# Patient Record
Sex: MALE | Race: WHITE | NOT HISPANIC OR LATINO | URBAN - NONMETROPOLITAN AREA
[De-identification: names, ages, dates, MRNs, and addresses within clinical notes are randomized per-mention and may not be internally consistent; named-entity substitution may affect disease eponyms.]

---

## 2016-08-10 PROBLEM — H52.4: Noted: 2019-05-21

## 2016-08-10 PROBLEM — H35.3131: Noted: 2017-02-08

## 2016-08-10 PROBLEM — H40.013: Noted: 2019-05-22

## 2017-03-23 NOTE — PROCEDURE NOTE: CLINICAL
PROCEDURE NOTE: Lucentis 0.5 mg OS. Diagnosis: Neovascular AMD with Active CNV. Anesthesia: Topical/Subconjunctival. Prep: Betadine Flush. Prior to injection, risks/benefits/alternatives discussed including infection, loss of vision, hemorrhage, cataract, glaucoma, retinal tears or detachment and patient wished to proceed. Topical anesthesia was induced with Alcaine. Additional anesthesia was achieved using drop(s) or injection checked above. A drop of Povidone-iodine 5% ophthalmic solution was instilled over the injection site and in the inferior fornix. Betadine prep was performed. A single use vial of intravitreal Lucentis 0.5mg/0.05ml was used and excess discarded. The needle was passed 3.0 mm posterior to the limbus in pseudophakic patients, and 3.5 mm posterior to the limbus in phakic patients. The remainder of the Lucentis 0.5mg in the single-use vial was then discarded in a medical waste disposal container. The eye was irrigated with sterile irrigating solution. Patient tolerated the procedure well. There were no complications. Post procedure instructions given. Injection Time 10:41 AM. CF vision checked. The patient was instructed to return for re-evaluation in approximately 4-12 weeks depending on his/her condition and was told to call immediately if vision decreases and/or if his/her eye becomes red, painful, and/or light sensitive. The patient was instructed to go to the emergency room or call 911 if unable to reach the doctor within an hour or two of trying or calling. The patient was instructed to use Artificial Tears q.i.d. p.r.n for comfort. Mukesh Gan

## 2017-05-12 NOTE — PROCEDURE NOTE: CLINICAL
PROCEDURE NOTE: Lucentis 0.5 mg OS. Diagnosis: Neovascular AMD with Active CNV. Anesthesia: Topical/Subconjunctival. Prep: Betadine Flush. Prior to injection, risks/benefits/alternatives discussed including infection, loss of vision, hemorrhage, cataract, glaucoma, retinal tears or detachment and patient wished to proceed. Topical anesthesia was induced with Alcaine. Additional anesthesia was achieved using drop(s) or injection checked above. A drop of Povidone-iodine 5% ophthalmic solution was instilled over the injection site and in the inferior fornix. Betadine prep was performed. A single use vial of intravitreal Lucentis 0.5mg/0.05ml was used and excess discarded. The needle was passed 3.0 mm posterior to the limbus in pseudophakic patients, and 3.5 mm posterior to the limbus in phakic patients. The remainder of the Lucentis 0.5mg in the single-use vial was then discarded in a medical waste disposal container. The eye was irrigated with sterile irrigating solution. Patient tolerated the procedure well. There were no complications. Post procedure instructions given. Injection Time 9:15AM. CF vision checked. The patient was instructed to return for re-evaluation in approximately 4-12 weeks depending on his/her condition and was told to call immediately if vision decreases and/or if his/her eye becomes red, painful, and/or light sensitive. The patient was instructed to go to the emergency room or call 911 if unable to reach the doctor within an hour or two of trying or calling. The patient was instructed to use Artificial Tears q.i.d. p.r.n for comfort. Evon Orosco

## 2017-06-23 NOTE — PROCEDURE NOTE: CLINICAL
PROCEDURE NOTE: Lucentis 0.5mg PFS OS. Diagnosis: Neovascular AMD with Active CNV. Anesthesia: Topical/Subconjunctival. Prep: Betadine Flush. Prior to injection, risks/benefits/alternatives discussed including but not limited to infection, loss of vision or eye, hemorrhage, cataract, glaucoma, retinal tears or detachment. The patient wished to proceed with treatment. Topical anesthesia was induced with Alcaine. Additional anesthesia was achieved using drop(s) or injection checked above. A drop of Povidone-iodine 5% ophthalmic solution was instilled over the injection site and in the inferior fornix. Betadine prep was performed. A single use prefilled syringe of intravitreal Lucentis 0.5mg/0.05ml was used and excess discarded. The needle was passed 3.0 mm posterior to the limbus in pseudophakic patients, and 3.5 mm posterior to the limbus in phakic patients. The remainder of the Lucentis 0.5mg in the single-use vial was then discarded in a medical waste disposal container. The eye was irrigated with sterile irrigating solution. Patient tolerated the procedure well. There were no complications. Post procedure instructions given. CF vision checked. Injection Time 9:25AM. The patient was instructed to return for re-evaluation in approximately 4-12 weeks depending on his/her condition and was told to call immediately if vision decreases and/or if his/her eye becomes red, painful, and/or light sensitive. The patient was instructed to go to the emergency room or call 911 if unable to reach the doctor within an hour or two of trying or calling. Shelby Chowdhury

## 2017-08-11 NOTE — PROCEDURE NOTE: CLINICAL
PROCEDURE NOTE: Lucentis 0.5mg PFS OS. Diagnosis: Neovascular AMD with Active CNV. Anesthesia: Topical/Subconjunctival. Prep: Betadine Flush. Prior to injection, risks/benefits/alternatives discussed including but not limited to infection, loss of vision or eye, hemorrhage, cataract, glaucoma, retinal tears or detachment. The patient wished to proceed with treatment. Topical anesthesia was induced with Alcaine. Additional anesthesia was achieved using drop(s) or injection checked above. A drop of Povidone-iodine 5% ophthalmic solution was instilled over the injection site and in the inferior fornix. Betadine prep was performed. A single use prefilled syringe of intravitreal Lucentis 0.5mg/0.05ml was used and excess discarded. The needle was passed 3.0 mm posterior to the limbus in pseudophakic patients, and 3.5 mm posterior to the limbus in phakic patients. The remainder of the Lucentis 0.5mg in the single-use vial was then discarded in a medical waste disposal container. The eye was irrigated with sterile irrigating solution. Patient tolerated the procedure well. There were no complications. Post procedure instructions given. CF vision checked. Injection Time 1140A. The patient was instructed to return for re-evaluation in approximately 4-12 weeks depending on his/her condition and was told to call immediately if vision decreases and/or if his/her eye becomes red, painful, and/or light sensitive. The patient was instructed to go to the emergency room or call 911 if unable to reach the doctor within an hour or two of trying or calling. Octaviano Fitzpatrick

## 2017-10-05 NOTE — PROCEDURE NOTE: CLINICAL
PROCEDURE NOTE: Lucentis 0.5mg PFS OS. Diagnosis: Neovascular AMD with Active CNV. Anesthesia: Topical/Subconjunctival. Prep: Betadine Flush. Prior to injection, risks/benefits/alternatives discussed including but not limited to infection, loss of vision or eye, hemorrhage, cataract, glaucoma, retinal tears or detachment. The patient wished to proceed with treatment. Topical anesthesia was induced with Alcaine. Additional anesthesia was achieved using drop(s) or injection checked above. A drop of Povidone-iodine 5% ophthalmic solution was instilled over the injection site and in the inferior fornix. Betadine prep was performed. A single use prefilled syringe of intravitreal Lucentis 0.5mg/0.05ml was used and excess discarded. The needle was passed 3.0 mm posterior to the limbus in pseudophakic patients, and 3.5 mm posterior to the limbus in phakic patients. The remainder of the Lucentis 0.5mg in the single-use vial was then discarded in a medical waste disposal container. The eye was irrigated with sterile irrigating solution. Patient tolerated the procedure well. There were no complications. Post procedure instructions given. CF vision checked. Injection Time 12:16PM. The patient was instructed to return for re-evaluation in approximately 4-12 weeks depending on his/her condition and was told to call immediately if vision decreases and/or if his/her eye becomes red, painful, and/or light sensitive. The patient was instructed to go to the emergency room or call 911 if unable to reach the doctor within an hour or two of trying or calling. Gustavo Orourke

## 2017-11-17 NOTE — PROCEDURE NOTE: CLINICAL
PROCEDURE NOTE: Lucentis 0.5mg PFS OS. Diagnosis: Neovascular AMD with Active CNV. Anesthesia: Topical/Subconjunctival. Prep: Betadine Flush. Prior to injection, risks/benefits/alternatives discussed including but not limited to infection, loss of vision or eye, hemorrhage, cataract, glaucoma, retinal tears or detachment. The patient wished to proceed with treatment. Topical anesthesia was induced with Alcaine. Additional anesthesia was achieved using drop(s) or injection checked above. A drop of Povidone-iodine 5% ophthalmic solution was instilled over the injection site and in the inferior fornix. Betadine prep was performed. A single use prefilled syringe of intravitreal Lucentis 0.5mg/0.05ml was used and excess discarded. The needle was passed 3.0 mm posterior to the limbus in pseudophakic patients, and 3.5 mm posterior to the limbus in phakic patients. The remainder of the Lucentis 0.5mg in the single-use vial was then discarded in a medical waste disposal container. The eye was irrigated with sterile irrigating solution. Patient tolerated the procedure well. There were no complications. Post procedure instructions given. CF vision checked. Injection Time 10:46AM. The patient was instructed to return for re-evaluation in approximately 4-12 weeks depending on his/her condition and was told to call immediately if vision decreases and/or if his/her eye becomes red, painful, and/or light sensitive. The patient was instructed to go to the emergency room or call 911 if unable to reach the doctor within an hour or two of trying or calling. Von Connors

## 2018-01-05 NOTE — PROCEDURE NOTE: CLINICAL
PROCEDURE NOTE: Lucentis 0.5mg PFS OS. Diagnosis: Neovascular AMD with Active CNV. Anesthesia: Subconjunctival. Prep: Betadine Flush. Prior to injection, risks/benefits/alternatives discussed including but not limited to infection, loss of vision or eye, hemorrhage, cataract, glaucoma, retinal tears or detachment. The patient wished to proceed with treatment. Topical anesthesia was induced with Alcaine. Additional anesthesia was achieved using drop(s) or injection checked above. A drop of Povidone-iodine 5% ophthalmic solution was instilled over the injection site and in the inferior fornix. Betadine prep was performed. A single use prefilled syringe of intravitreal Lucentis 0.5mg/0.05ml was used and excess discarded. The needle was passed 3.0 mm posterior to the limbus in pseudophakic patients, and 3.5 mm posterior to the limbus in phakic patients. The remainder of the Lucentis 0.5mg in the single-use vial was then discarded in a medical waste disposal container. The eye was irrigated with sterile irrigating solution. Patient tolerated the procedure well. There were no complications. Post procedure instructions given. CF vision checked. Injection Time *. The patient was instructed to return for re-evaluation in approximately 4-12 weeks depending on his/her condition and was told to call immediately if vision decreases and/or if his/her eye becomes red, painful, and/or light sensitive. The patient was instructed to go to the emergency room or call 911 if unable to reach the doctor within an hour or two of trying or calling. Von Connors

## 2018-02-23 NOTE — PROCEDURE NOTE: CLINICAL

## 2018-03-30 NOTE — PROCEDURE NOTE: CLINICAL
PROCEDURE NOTE: Lucentis 0.5mg PFS OS. Diagnosis: Neovascular AMD with Active CNV. Anesthesia: Topical/Subconjunctival. Prep: Betadine Flush. Prior to injection, risks/benefits/alternatives discussed including but not limited to infection, loss of vision or eye, hemorrhage, cataract, glaucoma, retinal tears or detachment. The patient wished to proceed with treatment. Topical anesthesia was induced with Alcaine. Additional anesthesia was achieved using drop(s) or injection checked above. A drop of Povidone-iodine 5% ophthalmic solution was instilled over the injection site and in the inferior fornix. Betadine prep was performed. A single use prefilled syringe of intravitreal Lucentis 0.5mg/0.05ml was used and excess discarded. The needle was passed 3.0 mm posterior to the limbus in pseudophakic patients, and 3.5 mm posterior to the limbus in phakic patients. The remainder of the Lucentis 0.5mg in the single-use vial was then discarded in a medical waste disposal container. The eye was irrigated with sterile irrigating solution. Patient tolerated the procedure well. There were no complications. Post procedure instructions given. CF vision checked. Injection Time 8:55AM. The patient was instructed to return for re-evaluation in approximately 4-12 weeks depending on his/her condition and was told to call immediately if vision decreases and/or if his/her eye becomes red, painful, and/or light sensitive. The patient was instructed to go to the emergency room or call 911 if unable to reach the doctor within an hour or two of trying or calling. Zeb Velez

## 2018-05-11 NOTE — PROCEDURE NOTE: CLINICAL
PROCEDURE NOTE: Lucentis 0.5mg PFS OS. Diagnosis: Neovascular AMD with Active CNV. Anesthesia: Topical/Subconjunctival. Prep: Betadine Flush. Prior to injection, risks/benefits/alternatives discussed including but not limited to infection, loss of vision or eye, hemorrhage, cataract, glaucoma, retinal tears or detachment. The patient wished to proceed with treatment. Topical anesthesia was induced with Alcaine. Additional anesthesia was achieved using drop(s) or injection checked above. A drop of Povidone-iodine 5% ophthalmic solution was instilled over the injection site and in the inferior fornix. Betadine prep was performed. A single use prefilled syringe of intravitreal Lucentis 0.5mg/0.05ml was used and excess discarded. The needle was passed 3.0 mm posterior to the limbus in pseudophakic patients, and 3.5 mm posterior to the limbus in phakic patients. The remainder of the Lucentis 0.5mg in the single-use vial was then discarded in a medical waste disposal container. The eye was irrigated with sterile irrigating solution. Patient tolerated the procedure well. There were no complications. Post procedure instructions given. CF vision checked. Injection Time 9:25 AM. The patient was instructed to return for re-evaluation in approximately 4-12 weeks depending on his/her condition and was told to call immediately if vision decreases and/or if his/her eye becomes red, painful, and/or light sensitive. The patient was instructed to go to the emergency room or call 911 if unable to reach the doctor within an hour or two of trying or calling. Sherly Pack

## 2018-07-13 NOTE — PROCEDURE NOTE: CLINICAL

## 2018-08-28 NOTE — PROCEDURE NOTE: CLINICAL
PROCEDURE NOTE: Lucentis 0.5mg PFS OS. Diagnosis: Neovascular AMD with Active CNV. Anesthesia: Topical. Prep: Betadine Flush. Prior to injection, risks/benefits/alternatives discussed including but not limited to infection, loss of vision or eye, hemorrhage, cataract, glaucoma, retinal tears or detachment. The patient wished to proceed with treatment. Topical anesthesia was induced with Alcaine. Additional anesthesia was achieved using drop(s) or injection checked above. A drop of Povidone-iodine 5% ophthalmic solution was instilled over the injection site and in the inferior fornix. Betadine prep was performed. A single use prefilled syringe of intravitreal Lucentis 0.5mg/0.05ml was used and excess discarded. The needle was passed 3.0 mm posterior to the limbus in pseudophakic patients, and 3.5 mm posterior to the limbus in phakic patients. The remainder of the Lucentis 0.5mg in the single-use vial was then discarded in a medical waste disposal container. The eye was irrigated with sterile irrigating solution. Patient tolerated the procedure well. There were no complications. Post procedure instructions given. CF vision checked. Injection Time 10:11AM. The patient was instructed to return for re-evaluation in approximately 4-12 weeks depending on his/her condition and was told to call immediately if vision decreases and/or if his/her eye becomes red, painful, and/or light sensitive. The patient was instructed to go to the emergency room or call 911 if unable to reach the doctor within an hour or two of trying or calling. Eduin Adames

## 2018-10-19 NOTE — PROCEDURE NOTE: CLINICAL
PROCEDURE NOTE: Lucentis 0.5mg PFS OS. Diagnosis: Neovascular AMD with Active CNV. Anesthesia: Topical/Subconjunctival. Prep: Betadine Flush. Prior to injection, risks/benefits/alternatives discussed including but not limited to infection, loss of vision or eye, hemorrhage, cataract, glaucoma, retinal tears or detachment. The patient wished to proceed with treatment. Topical anesthesia was induced with Alcaine. Additional anesthesia was achieved using drop(s) or injection checked above. A drop of Povidone-iodine 5% ophthalmic solution was instilled over the injection site and in the inferior fornix. Betadine prep was performed. A single use prefilled syringe of intravitreal Lucentis 0.5mg/0.05ml was used and excess discarded. The needle was passed 3.0 mm posterior to the limbus in pseudophakic patients, and 3.5 mm posterior to the limbus in phakic patients. The remainder of the Lucentis 0.5mg in the single-use vial was then discarded in a medical waste disposal container. The eye was irrigated with sterile irrigating solution. Patient tolerated the procedure well. There were no complications. Post procedure instructions given. CF vision checked. Injection Time 8:45 AM. The patient was instructed to return for re-evaluation in approximately 4-12 weeks depending on his/her condition and was told to call immediately if vision decreases and/or if his/her eye becomes red, painful, and/or light sensitive. The patient was instructed to go to the emergency room or call 911 if unable to reach the doctor within an hour or two of trying or calling. Yolanda Santa

## 2018-12-14 NOTE — PROCEDURE NOTE: CLINICAL
PROCEDURE NOTE: Lucentis 0.5mg PFS OS. Diagnosis: Neovascular AMD with Active CNV. Anesthesia: Topical. Prep: Betadine Flush. Prior to injection, risks/benefits/alternatives discussed including but not limited to infection, loss of vision or eye, hemorrhage, cataract, glaucoma, retinal tears or detachment. The patient wished to proceed with treatment. Topical anesthesia was induced with Alcaine. Additional anesthesia was achieved using drop(s) or injection checked above. A drop of Povidone-iodine 5% ophthalmic solution was instilled over the injection site and in the inferior fornix. Betadine prep was performed. A single use prefilled syringe of intravitreal Lucentis 0.5mg/0.05ml was used and excess discarded. The needle was passed 3.0 mm posterior to the limbus in pseudophakic patients, and 3.5 mm posterior to the limbus in phakic patients. The remainder of the Lucentis 0.5mg in the single-use vial was then discarded in a medical waste disposal container. The eye was irrigated with sterile irrigating solution. Patient tolerated the procedure well. There were no complications. Post procedure instructions given. CF vision checked. Injection Time 9:15 AM. The patient was instructed to return for re-evaluation in approximately 4-12 weeks depending on his/her condition and was told to call immediately if vision decreases and/or if his/her eye becomes red, painful, and/or light sensitive. The patient was instructed to go to the emergency room or call 911 if unable to reach the doctor within an hour or two of trying or calling. [facility]

## 2019-02-01 NOTE — PROCEDURE NOTE: CLINICAL
PROCEDURE NOTE: Lucentis 0.5mg PFS OS. Diagnosis: Neovascular AMD with Active CNV. Anesthesia: Topical/Subconjunctival. Prep: Betadine Flush. Prior to injection, risks/benefits/alternatives discussed including but not limited to infection, loss of vision or eye, hemorrhage, cataract, glaucoma, retinal tears or detachment. The patient wished to proceed with treatment. Topical anesthesia was induced with Alcaine. Additional anesthesia was achieved using drop(s) or injection checked above. A drop of Povidone-iodine 5% ophthalmic solution was instilled over the injection site and in the inferior fornix. Betadine prep was performed. A single use prefilled syringe of intravitreal Lucentis 0.5mg/0.05ml was used and excess discarded. The needle was passed 3.0 mm posterior to the limbus in pseudophakic patients, and 3.5 mm posterior to the limbus in phakic patients. The remainder of the Lucentis 0.5mg in the single-use vial was then discarded in a medical waste disposal container. The eye was irrigated with sterile irrigating solution. Patient tolerated the procedure well. There were no complications. Post procedure instructions given. CF vision checked. Injection Time 9:39AM. The patient was instructed to return for re-evaluation in approximately 4-12 weeks depending on his/her condition and was told to call immediately if vision decreases and/or if his/her eye becomes red, painful, and/or light sensitive. The patient was instructed to go to the emergency room or call 911 if unable to reach the doctor within an hour or two of trying or calling. Elvi Pinon

## 2019-03-08 ENCOUNTER — IMPORTED ENCOUNTER (OUTPATIENT)
Dept: URBAN - NONMETROPOLITAN AREA CLINIC 1 | Facility: CLINIC | Age: 84
End: 2019-03-08

## 2019-03-08 PROCEDURE — 99214 OFFICE O/P EST MOD 30 MIN: CPT

## 2019-03-08 PROCEDURE — 92134 CPTRZ OPH DX IMG PST SGM RTA: CPT

## 2019-03-08 NOTE — PATIENT DISCUSSION
Nuclear Sclerosis OU -  discussed findings w/patient-  no treatment indicated at this time-  UV protection recommended-  monitor yearly or prnARMD OU -  discussed findings w/patient-  no known family history-  continue AREDS 2 PO BID -  continue AG daily patient to call or come in if changes in vision are noted from today -  OCT Mac done today OD: R fine drusen trace ERM OS: R fine drusen trace ERM -  monitor yearly w/ OCT mac or prn Glaucoma Suspect -  discussed findings w/patient-  no known family history-  IOP's stable at 25 OU-  C/D 0.25 OU-  healthy nerve appearance-  continue without drop at this time -  monitor yearly or prn Presbyopia-  discussed findings w/patient-  patient defers MR at this time -  monitor yearly or prn; 's Notes: MR defer 3/8/2019DFE 3/8/2019OCT mac 3/8/2019

## 2019-03-22 NOTE — PROCEDURE NOTE: CLINICAL
PROCEDURE NOTE: Lucentis 0.5mg PFS OS. Diagnosis: Neovascular AMD with Active CNV. Anesthesia: Topical. Prep: Betadine Flush. Prior to injection, risks/benefits/alternatives discussed including but not limited to infection, loss of vision or eye, hemorrhage, cataract, glaucoma, retinal tears or detachment. The patient wished to proceed with treatment. Topical anesthesia was induced with Alcaine. Additional anesthesia was achieved using drop(s) or injection checked above. A drop of Povidone-iodine 5% ophthalmic solution was instilled over the injection site and in the inferior fornix. Betadine prep was performed. A single use prefilled syringe of intravitreal Lucentis 0.5mg/0.05ml was used and excess discarded. The needle was passed 3.0 mm posterior to the limbus in pseudophakic patients, and 3.5 mm posterior to the limbus in phakic patients. The remainder of the Lucentis 0.5mg in the single-use vial was then discarded in a medical waste disposal container. The eye was irrigated with sterile irrigating solution. Patient tolerated the procedure well. There were no complications. Post procedure instructions given. CF vision checked. Injection Time 943. The patient was instructed to return for re-evaluation in approximately 4-12 weeks depending on his/her condition and was told to call immediately if vision decreases and/or if his/her eye becomes red, painful, and/or light sensitive. The patient was instructed to go to the emergency room or call 911 if unable to reach the doctor within an hour or two of trying or calling. Nancy Frank

## 2019-05-17 NOTE — PROCEDURE NOTE: CLINICAL
PROCEDURE NOTE: Lucentis 0.5mg PFS OS. Diagnosis: Neovascular AMD with Active CNV. Anesthesia: Subconjunctival. Prep: Betadine Flush. Prior to injection, risks/benefits/alternatives discussed including but not limited to infection, loss of vision or eye, hemorrhage, cataract, glaucoma, retinal tears or detachment. The patient wished to proceed with treatment. Topical anesthesia was induced with Alcaine. Additional anesthesia was achieved using drop(s) or injection checked above. A drop of Povidone-iodine 5% ophthalmic solution was instilled over the injection site and in the inferior fornix. Betadine prep was performed. A single use prefilled syringe of intravitreal Lucentis 0.5mg/0.05ml was used and excess discarded. The needle was passed 3.0 mm posterior to the limbus in pseudophakic patients, and 3.5 mm posterior to the limbus in phakic patients. The remainder of the Lucentis 0.5mg in the single-use vial was then discarded in a medical waste disposal container. The eye was irrigated with sterile irrigating solution. Patient tolerated the procedure well. There were no complications. Post procedure instructions given. CF vision checked. Injection Time 9:35 AM. The patient was instructed to return for re-evaluation in approximately 4-12 weeks depending on his/her condition and was told to call immediately if vision decreases and/or if his/her eye becomes red, painful, and/or light sensitive. The patient was instructed to go to the emergency room or call 911 if unable to reach the doctor within an hour or two of trying or calling. Kathie Stern

## 2019-07-16 NOTE — PROCEDURE NOTE: CLINICAL
PROCEDURE NOTE: Lucentis 0.5mg PFS OS. Diagnosis: Neovascular AMD with Active CNV. Anesthesia: Topical/Subconjunctival. Prep: Betadine Flush. Prior to injection, risks/benefits/alternatives discussed including but not limited to infection, loss of vision or eye, hemorrhage, cataract, glaucoma, retinal tears or detachment. The patient wished to proceed with treatment. Topical anesthesia was induced with Alcaine. Additional anesthesia was achieved using drop(s) or injection checked above. A drop of Povidone-iodine 5% ophthalmic solution was instilled over the injection site and in the inferior fornix. Betadine prep was performed. A single use prefilled syringe of intravitreal Lucentis 0.5mg/0.05ml was used and excess discarded. The needle was passed 3.0 mm posterior to the limbus in pseudophakic patients, and 3.5 mm posterior to the limbus in phakic patients. The remainder of the Lucentis 0.5mg in the single-use vial was then discarded in a medical waste disposal container. The eye was irrigated with sterile irrigating solution. Patient tolerated the procedure well. There were no complications. Post procedure instructions given. CF vision checked. Injection Time 9:52AM. The patient was instructed to return for re-evaluation in approximately 4-12 weeks depending on his/her condition and was told to call immediately if vision decreases and/or if his/her eye becomes red, painful, and/or light sensitive. The patient was instructed to go to the emergency room or call 911 if unable to reach the doctor within an hour or two of trying or calling. Bryant Navarro

## 2019-08-10 NOTE — PATIENT DISCUSSION
ARTIFICIAL TEARS to affected eye(s) as needed.
BMI above normal limits, recommend weight loss, improve diet and follow up with internist.
Based on today's exam, diagnostic studies, and/or review of records, the determination was made for treatment today. LUCENTIS 0.5mg recommended TODAY after discussion of benefits, risks and alternatives. The injection was given and tolerated well by the patient. Post-injection instructions were reviewed and understood by the patient. Procedure was performed without complications. Instructed to call immediately if any new distortion, blurring, decreased vision or eye pain.
Cautioned may occur in other eye in future.
Continue to MONITOR CLOSELY to determine the need for TREATMENT and INCREASE/DECREASE in length of time till next follow up visit.
My findings and recommendations TODAY are based on the patient's symptoms, exam, diagnostic testing and records.
NON-SMOKER.
No retinal holes or tears seen on exam. Recommended observation. We reviewed the signs and symptoms of retinal tear/retinal detachment and the importance of prompt evaluation should there be increasing floaters, new flashing lights, or decreasing peripheral vision in either eye at any time. Patient understands condition, prognosis and need for follow up care.
Recommended OBSERVATION and continued MONITORING for response to treatment.
Recommended observation.
Recommended weight and BMI control through healthy diet and exercise, green leafy veggies, UV protection, and not smoking. Reviewed the benefits of AREDS II VITAMINS VERUS GENOTYPE directed vitamin therapy, and recommended following one or the other to try and prevent the progression of the disease. Reviewed the importance of daily monitoring of the vision in each eye independently, along with the use of the Amsler grid daily and instructed patient to call and return immediately for any new changes in their vision or on the Amsler grid. Patient instructed on the importance of regular follow up and monitoring for the early detection of conversion to wet AMD as early detection results in early treatment and better outcomes.
THEN LUCENTIS 0.5 MG TIMES 2 NEXT 2 -4 MONTHS.
The patient is at high risk for a choroidal neovascular membrane. NO CNV SEEN TODAY. Dry ARMD is responsible for some decrease in vision.
Today's exam, diagnostic studies, and/or review of records show NO SIGNIFICANT CHANGE from previous exams.
WORSENED 3/23/17.
PCP for Routine Care

## 2019-08-16 NOTE — PROCEDURE NOTE: CLINICAL
PROCEDURE NOTE: Lucentis 0.5mg PFS OS. Diagnosis: Neovascular AMD with Active CNV. Anesthesia: Subconjunctival. Prep: Betadine Flush. Prior to injection, risks/benefits/alternatives discussed including but not limited to infection, loss of vision or eye, hemorrhage, cataract, glaucoma, retinal tears or detachment. The patient wished to proceed with treatment. Topical anesthesia was induced with Alcaine. Additional anesthesia was achieved using drop(s) or injection checked above. A drop of Povidone-iodine 5% ophthalmic solution was instilled over the injection site and in the inferior fornix. Betadine prep was performed. A single use prefilled syringe of intravitreal Lucentis 0.5mg/0.05ml was used and excess discarded. The needle was passed 3.0 mm posterior to the limbus in pseudophakic patients, and 3.5 mm posterior to the limbus in phakic patients. The remainder of the Lucentis 0.5mg in the single-use vial was then discarded in a medical waste disposal container. The eye was irrigated with sterile irrigating solution. Patient tolerated the procedure well. There were no complications. Post procedure instructions given. CF vision checked. Injection Time 9:50AM. The patient was instructed to return for re-evaluation in approximately 4-12 weeks depending on his/her condition and was told to call immediately if vision decreases and/or if his/her eye becomes red, painful, and/or light sensitive. The patient was instructed to go to the emergency room or call 911 if unable to reach the doctor within an hour or two of trying or calling. Isael Ernandez

## 2019-10-04 NOTE — PROCEDURE NOTE: CLINICAL
PROCEDURE NOTE: Lucentis 0.5mg PFS OS. Diagnosis: Neovascular AMD with Active CNV. Anesthesia: Topical/Subconjunctival. Prep: Betadine Flush. Prior to injection, risks/benefits/alternatives discussed including but not limited to infection, loss of vision or eye, hemorrhage, cataract, glaucoma, retinal tears or detachment. The patient wished to proceed with treatment. Topical anesthesia was induced with Alcaine. Additional anesthesia was achieved using drop(s) or injection checked above. A drop of Povidone-iodine 5% ophthalmic solution was instilled over the injection site and in the inferior fornix. Betadine prep was performed. A single use prefilled syringe of intravitreal Lucentis 0.5mg/0.05ml was used and excess discarded. The needle was passed 3.0 mm posterior to the limbus in pseudophakic patients, and 3.5 mm posterior to the limbus in phakic patients. The remainder of the Lucentis 0.5mg in the single-use vial was then discarded in a medical waste disposal container. The eye was irrigated with sterile irrigating solution. Patient tolerated the procedure well. There were no complications. Post procedure instructions given. CF vision checked. Injection Time 906. The patient was instructed to return for re-evaluation in approximately 4-12 weeks depending on his/her condition and was told to call immediately if vision decreases and/or if his/her eye becomes red, painful, and/or light sensitive. The patient was instructed to go to the emergency room or call 911 if unable to reach the doctor within an hour or two of trying or calling. Kevyn Farr Statement Selected

## 2019-11-22 NOTE — PROCEDURE NOTE: CLINICAL
PROCEDURE NOTE: Lucentis 0.5mg PFS OS. Diagnosis: Neovascular AMD with Active CNV. Anesthesia: Topical/Subconjunctival. Prep: Betadine Flush. Prior to injection, risks/benefits/alternatives discussed including but not limited to infection, loss of vision or eye, hemorrhage, cataract, glaucoma, retinal tears or detachment. The patient wished to proceed with treatment. Topical anesthesia was induced with Alcaine. Additional anesthesia was achieved using drop(s) or injection checked above. A drop of Povidone-iodine 5% ophthalmic solution was instilled over the injection site and in the inferior fornix. Betadine prep was performed. A single use prefilled syringe of intravitreal Lucentis 0.5mg/0.05ml was used and excess discarded. The needle was passed 3.0 mm posterior to the limbus in pseudophakic patients, and 3.5 mm posterior to the limbus in phakic patients. The remainder of the Lucentis 0.5mg in the single-use vial was then discarded in a medical waste disposal container. The eye was irrigated with sterile irrigating solution. Patient tolerated the procedure well. There were no complications. Post procedure instructions given. CF vision checked. Injection Time *. The patient was instructed to return for re-evaluation in approximately 4-12 weeks depending on his/her condition and was told to call immediately if vision decreases and/or if his/her eye becomes red, painful, and/or light sensitive. The patient was instructed to go to the emergency room or call 911 if unable to reach the doctor within an hour or two of trying or calling. Sharona Leung

## 2020-01-10 NOTE — PROCEDURE NOTE: CLINICAL
PROCEDURE NOTE: Lucentis 0.5mg PFS OS. Diagnosis: Neovascular AMD with Active CNV. Anesthesia: Topical/Subconjunctival. Prep: Betadine Flush. Prior to injection, risks/benefits/alternatives discussed including but not limited to infection, loss of vision or eye, hemorrhage, cataract, glaucoma, retinal tears or detachment. The patient wished to proceed with treatment. Topical anesthesia was induced with Alcaine. Additional anesthesia was achieved using drop(s) or injection checked above. A drop of Povidone-iodine 5% ophthalmic solution was instilled over the injection site and in the inferior fornix. Betadine prep was performed. A single use prefilled syringe of intravitreal Lucentis 0.5mg/0.05ml was used and excess discarded. The needle was passed 3.0 mm posterior to the limbus in pseudophakic patients, and 3.5 mm posterior to the limbus in phakic patients. Injection Time 9:30 AM. Patient tolerated the procedure well. There were no complications. The eye was irrigated with sterile irrigating solution. Post procedure instructions given. The patient was instructed to use Artificial Tears q.i.d. p.r.n for comfort. The patient was instructed to return for re-evaluation in approximately 4-12 weeks depending on his/her condition and was told to call immediately if vision decreases and/or if his/her eye becomes red, painful, and/or light sensitive. The patient was instructed to go to the emergency room or call 911 if unable to reach the doctor within an hour or two of trying or calling. Elvi Pinon

## 2020-02-28 NOTE — PROCEDURE NOTE: CLINICAL
PROCEDURE NOTE: Lucentis 0.5mg PFS OS. Diagnosis: Neovascular AMD with Active CNV. Anesthesia: Topical/Subconjunctival. Prep: Betadine Flush. Prior to injection, risks/benefits/alternatives discussed including but not limited to infection, loss of vision or eye, hemorrhage, cataract, glaucoma, retinal tears or detachment. The patient wished to proceed with treatment. Topical anesthesia was induced with Alcaine. Additional anesthesia was achieved using drop(s) or injection checked above. A drop of Povidone-iodine 5% ophthalmic solution was instilled over the injection site and in the inferior fornix. Betadine prep was performed. A single use prefilled syringe of intravitreal Lucentis 0.5mg/0.05ml was used and excess was disposed of as waste. The needle was passed 3.0 mm posterior to the limbus in pseudophakic patients, and 3.5 mm posterior to the limbus in phakic patients. Injection Time 938AM. Patient tolerated the procedure well. There were no complications. The eye was irrigated with sterile irrigating solution. Post procedure instructions given. The patient was instructed to use Artificial Tears q.i.d. p.r.n for comfort. The patient was instructed to return for re-evaluation in approximately 4-12 weeks depending on his/her condition and was told to call immediately if vision decreases and/or if his/her eye becomes red, painful, and/or light sensitive. The patient was instructed to go to the emergency room or call 911 if unable to reach the doctor within an hour or two of trying or calling. Roberta Berry

## 2020-05-08 NOTE — PROCEDURE NOTE: CLINICAL
PROCEDURE NOTE: Lucentis 0.5mg PFS OS. Diagnosis: Neovascular AMD with Active CNV. Anesthesia: Topical/Subconjunctival. Prep: Betadine Flush. Prior to injection, risks/benefits/alternatives discussed including but not limited to infection, loss of vision or eye, hemorrhage, cataract, glaucoma, retinal tears or detachment. The patient wished to proceed with treatment. Topical anesthesia was induced with Alcaine. Additional anesthesia was achieved using drop(s) or injection checked above. A drop of Povidone-iodine 5% ophthalmic solution was instilled over the injection site and in the inferior fornix. Betadine prep was performed. A single use prefilled syringe of intravitreal Lucentis 0.5mg/0.05ml was used and excess was disposed of as waste. The needle was passed 3.0 mm posterior to the limbus in pseudophakic patients, and 3.5 mm posterior to the limbus in phakic patients. Injection Time 841. Patient tolerated the procedure well. There were no complications. The eye was irrigated with sterile irrigating solution. Post procedure instructions given. The patient was instructed to use Artificial Tears q.i.d. p.r.n for comfort. The patient was instructed to return for re-evaluation in approximately 4-12 weeks depending on his/her condition and was told to call immediately if vision decreases and/or if his/her eye becomes red, painful, and/or light sensitive. The patient was instructed to go to the emergency room or call 911 if unable to reach the doctor within an hour or two of trying or calling. Shelby Chowdhury

## 2020-07-30 NOTE — PROCEDURE NOTE: CLINICAL
PROCEDURE NOTE: Lucentis 0.5mg PFS OS. Diagnosis: Neovascular AMD with Active CNV. Anesthesia: Subconjunctival. Prep: Betadine Flush. Prior to injection, risks/benefits/alternatives discussed including but not limited to infection, loss of vision or eye, hemorrhage, cataract, glaucoma, retinal tears or detachment. The patient wished to proceed with treatment. Topical anesthesia was induced with Alcaine. Additional anesthesia was achieved using drop(s) or injection checked above. A drop of Povidone-iodine 5% ophthalmic solution was instilled over the injection site and in the inferior fornix. Betadine prep was performed. A single use prefilled syringe of intravitreal Lucentis 0.5mg/0.05ml was used and excess was disposed of as waste. The needle was passed 3.0 mm posterior to the limbus in pseudophakic patients, and 3.5 mm posterior to the limbus in phakic patients. Injection Time 1132. Patient tolerated the procedure well. There were no complications. The eye was irrigated with sterile irrigating solution. Post procedure instructions given. The patient was instructed to use Artificial Tears q.i.d. p.r.n for comfort. The patient was instructed to return for re-evaluation in approximately 4-12 weeks depending on his/her condition and was told to call immediately if vision decreases and/or if his/her eye becomes red, painful, and/or light sensitive. The patient was instructed to go to the emergency room or call 911 if unable to reach the doctor within an hour or two of trying or calling. Isael Ernandez

## 2020-09-18 NOTE — PROCEDURE NOTE: CLINICAL
PROCEDURE NOTE: Lucentis 0.5mg PFS OS. Diagnosis: Neovascular AMD with Active CNV. Anesthesia: Topical/Subconjunctival. Prep: Betadine Flush. Prior to injection, risks/benefits/alternatives discussed including but not limited to infection, loss of vision or eye, hemorrhage, cataract, glaucoma, retinal tears or detachment. The patient wished to proceed with treatment. Topical anesthesia was induced with Alcaine. Additional anesthesia was achieved using drop(s) or injection checked above. A drop of Povidone-iodine 5% ophthalmic solution was instilled over the injection site and in the inferior fornix. Betadine prep was performed. A single use prefilled syringe of intravitreal Lucentis 0.5mg/0.05ml was used and excess was disposed of as waste. The needle was passed 3.0 mm posterior to the limbus in pseudophakic patients, and 3.5 mm posterior to the limbus in phakic patients. Injection Time 836A. Patient tolerated the procedure well. There were no complications. The eye was irrigated with sterile irrigating solution. Post procedure instructions given. The patient was instructed to use Artificial Tears q.i.d. p.r.n for comfort. The patient was instructed to return for re-evaluation in approximately 4-12 weeks depending on his/her condition and was told to call immediately if vision decreases and/or if his/her eye becomes red, painful, and/or light sensitive. The patient was instructed to go to the emergency room or call 911 if unable to reach the doctor within an hour or two of trying or calling. Nancy Frank

## 2020-11-06 NOTE — PROCEDURE NOTE: CLINICAL
PROCEDURE NOTE: Lucentis 0.5mg PFS OS. Diagnosis: Neovascular AMD with Active CNV. Anesthesia: Subconjunctival. Prep: Betadine Flush. Prior to injection, risks/benefits/alternatives discussed including but not limited to infection, loss of vision or eye, hemorrhage, cataract, glaucoma, retinal tears or detachment. The patient wished to proceed with treatment. Topical anesthesia was induced with Alcaine. Additional anesthesia was achieved using drop(s) or injection checked above. A drop of Povidone-iodine 5% ophthalmic solution was instilled over the injection site and in the inferior fornix. Betadine prep was performed. A single use prefilled syringe of intravitreal Lucentis 0.5mg/0.05ml was used and excess was disposed of as waste. The needle was passed 3.0 mm posterior to the limbus in pseudophakic patients, and 3.5 mm posterior to the limbus in phakic patients. Injection Time 852am. Patient tolerated the procedure well. There were no complications. The eye was irrigated with sterile irrigating solution. Post procedure instructions given. The patient was instructed to use Artificial Tears q.i.d. p.r.n for comfort. The patient was instructed to return for re-evaluation in approximately 4-12 weeks depending on his/her condition and was told to call immediately if vision decreases and/or if his/her eye becomes red, painful, and/or light sensitive. The patient was instructed to go to the emergency room or call 911 if unable to reach the doctor within an hour or two of trying or calling. Taiwo Byers

## 2021-08-23 NOTE — PATIENT DISCUSSION
Continue to MONITOR CLOSELY to determine the need for TREATMENT and INCREASE/DECREASE in length of time till next follow up visit. full weight-bearing

## 2022-01-16 NOTE — PATIENT DISCUSSION
My findings and recommendations TODAY are based on the patient's symptoms, exam, diagnostic testing and records. Patient/Caregiver provided printed discharge information.

## 2022-04-09 ASSESSMENT — VISUAL ACUITY
OU_SC: 20/20-2
OS_GLARE: 20/30
OS_SC: 20/25-2
OD_SC: 20/30-2
OD_GLARE: 20/40
OU_CC: J1+

## 2022-04-09 ASSESSMENT — TONOMETRY
OS_IOP_MMHG: 18
OD_IOP_MMHG: 18

## 2024-11-13 NOTE — PATIENT DISCUSSION
My findings and recommendations TODAY are based on the patient's symptoms, exam, diagnostic testing and records. Patient most likely will need to continue to remain on omeprazole due to history of peptic ulcer disease.  Unfortunately he has already trialed famotidine in the past and has failed.  Continue with current dosing of omeprazole, continue with lifestyle modifications as indicated.